# Patient Record
Sex: MALE | Race: BLACK OR AFRICAN AMERICAN | Employment: UNEMPLOYED | URBAN - METROPOLITAN AREA
[De-identification: names, ages, dates, MRNs, and addresses within clinical notes are randomized per-mention and may not be internally consistent; named-entity substitution may affect disease eponyms.]

---

## 2020-05-21 NOTE — ED NOTES
Stacey Colorado has been accepted for admission to [facility] under the care of Dr Jessie Lopez. [facility] requested that transport be scheduled for 2:30 p.m.

## 2020-05-21 NOTE — ED PROVIDER NOTES
Patient Seen in: [facility] Emergency Department      History   Patient presents with:  Eval-P    Stated Complaint:     HPI    38 yoM presenting for evaluation of intoxication. He was found on the sidewalk sleeping by the [employer].   He a Normal breath sounds. Abdominal:      General: There is no distension. Palpations: Abdomen is soft. Tenderness: There is no tenderness. Musculoskeletal: Normal range of motion. Skin:     General: Skin is warm. Findings: No rash.    [facility] ---------                               -----------         ------                     CBC W/ DIFFERENTIAL[[id number]]          Abnormal            Final result                 Please view results for these tests on the individual orders.    REENTTA

## 2020-05-21 NOTE — ED NOTES
1:1 direct observation taken over by this ERT. Pt resting on cart with water at bedside. Half of belongings left in room at bedside per TIGIST Hollingsworth.

## 2020-05-21 NOTE — ED NOTES
Spoke to Sandra at [facility].  Referral packet faxed. Updated Shelbi Natarajan RN that they are requesting an EKG. Will fax EKG and COVID results when available.

## 2020-05-21 NOTE — ED INITIAL ASSESSMENT (HPI)
Pt brought in by EMS after pt was found sleeping on the sidewalk by PD. Pt \"hallucinating and talking to himself\". Also told EMS had used drugs and ETOH. RR even and nonlabored, speaking in full sentences, ambulatory with steady gait.  Pt changed into luis eduardo

## 2020-05-21 NOTE — ED NOTES
Pt became agitated during belongings search. Able to redirect and de-escalate with the help of security. Pt cooperative with medication administration.  Security able to search through remaining belongings in room, plan to remove once medication takes effec

## 2020-05-21 NOTE — ED NOTES
Pt safe to transfer to [facility] per MD. Report given to [facility] along with belongings from security, chart, and [employer] forms. RR form put in pt's belongings. Wheeled to exit on cart.

## 2020-05-21 NOTE — BH LEVEL OF CARE ASSESSMENT
Level of Care Assessment Note    General Questions  Why are you here?: \"Help. I guess some brother called 911 cause they saw me lying on the ground.   I was resting to build up my strength to ride my bike  Precipitating Events: Bang Mcrae was found sleeping Denies    Danger to Others/Property  Have you harmed someone or had thoughts about wanting someone harmed or killed in the past 30 days?: No  Have you harmed someone or had thoughts about wanting someone harmed or killed further back than 30 days?: No  Cur you feel uncomfortably full?: No  Do you worry that you have lost Control over how much you eat?: No  Have you recently lost more than One stone (14 lb) in a 3-month period?: No  Do you believe yourself to be Fat when others say you are too thin?: No  Woul Support for Recovery  Is your living environment a supportive place for recovery?: No  Describe: homeless, lacks supports     Withdrawal Symptoms  History of Withdrawal Symptoms: Denies past symptoms  Last Withdrawal Episode: past withdrawal symptoms a writer)  Rate of Movement: Normal  Mood and Affect  Mood or Feelings: Irritability  Appropriateness of Affect: Congruent to mood  Range of Affect: Normal  Stability of Affect: Stable  Attitude toward staff: Suspicious;Guarded; Co-operative  Speech  Rate of Change: Pre-Contemplative    Level of Care Recommendations  Consulted with: Dr Suha Cruz  Level of Care Recommendation: Inpatient Acute Care  Unit: Adult  Reason for Unit Assigned: age and symptoms  Inpatient Criteria: 24 hr behavior monitoring; Failure at low

## 2020-10-19 ENCOUNTER — HOSPITAL ENCOUNTER (EMERGENCY)
Facility: HOSPITAL | Age: 35
Discharge: HOME | End: 2020-10-19
Attending: EMERGENCY MEDICINE
Payer: COMMERCIAL

## 2020-10-19 VITALS
HEIGHT: 74 IN | HEART RATE: 60 BPM | BODY MASS INDEX: 25.28 KG/M2 | OXYGEN SATURATION: 99 % | TEMPERATURE: 98 F | RESPIRATION RATE: 18 BRPM | SYSTOLIC BLOOD PRESSURE: 124 MMHG | WEIGHT: 197 LBS | DIASTOLIC BLOOD PRESSURE: 90 MMHG

## 2020-10-19 DIAGNOSIS — E16.2 HYPOGLYCEMIA: Primary | ICD-10-CM

## 2020-10-19 PROCEDURE — 99283 EMERGENCY DEPT VISIT LOW MDM: CPT

## 2020-10-19 PROCEDURE — 80320 DRUG SCREEN QUANTALCOHOLS: CPT | Performed by: EMERGENCY MEDICINE

## 2020-10-19 PROCEDURE — 36415 COLL VENOUS BLD VENIPUNCTURE: CPT

## 2020-10-19 PROCEDURE — 82962 GLUCOSE BLOOD TEST: CPT

## 2020-10-19 NOTE — ED PROVIDER NOTES
Patient Seen in: [facility] AND [facility] Emergency Department    History   Patient presents with:  Psychiatric Problem    Stated Complaint: PSYCH    HPI    Patient brought by EMS in police custody. He was arrested last night.   He was telling the police to Maximino Orourke abdomen is soft and non tender, no masses, nl bowel sounds   EXTREMITIES: from, 5/5 strength in all 4 ext, no edema  NEURO: alert and oiented *3, 2-12 intact, no focal deficit noted  SKIN: good skin turgor, no  rashes  PSYCH: calm, cooperative, denies SI

## (undated) NOTE — LETTER
Date & Time: 10/19/2020, 7:12 AM  Patient: Jacek Chandan  Encounter Provider(s):    Francisca Diane MD          I have seen Jacek Hawley 3/13/1985 and found him medically cleared for incarceration with [address]        _____________________________  P